# Patient Record
(demographics unavailable — no encounter records)

---
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
## 2024-10-19 NOTE — PROCEDURE
No
[de-identified] : Ultrasound Guided Injection  Indication for ultrasound guidance: Ensure placement within the knee joint  Utlizing the TIMPIKe portable ultrasound machine, the Linear 6cm 13-6 MHz transducer and sterile ultrasound gel, ultrasound guidance with the probe in the short axis, utilizing an in plane approach, was used for the following injection:   Injection: right nee joint. Indication: Osteoarthritis.  A discussion was had with the patient regarding this procedure and all questions were answered. All risks, benefits and alternatives were discussed. These included but were not limited to bleeding, infection, allergic reaction and reaccumulation of fluid. A timeout was done to ensure correct side and pt agreed to the procedure.  Alcohol was used to clean the skin, and betadine was used to sterilize and prep the area in the superolateral joint line aspect of the knee. A 25 -gauge needle was used to inject 3cc of 1% lidocaine without epinephrine.  A 22-gauge needle was used to inject 4cc of 1% lidocaine without epinephrine and 1cc of 40mg/ml methylprednisolone into the knee. A sterile bandage was then applied. The patient tolerated the procedure well.    Injection:left knee joint. Indication: Osteoarthritis.  A discussion was had with the patient regarding this procedure and all questions were answered. All risks, benefits and alternatives were discussed. These included but were not limited to bleeding, infection, allergic reaction and reaccumulation of fluid. A timeout was done to ensure correct side and pt agreed to the procedure.  Alcohol was used to clean the skin, and betadine was used to sterilize and prep the area in the superolateral joint line aspect of the knee. A 25 -gauge needle was used to inject 3cc of 1% lidocaine without epinephrine.  A 22-gauge needle was used to inject 4cc of 1% lidocaine without epinephrine and 1cc of 40mg/ml methylprednisolone into the knee. A sterile bandage was then applied. The patient tolerated the procedure well.  
[de-identified] : Ultrasound Guided Injection  Indication for ultrasound guidance: Ensure placement within the knee joint  Utlizing the [employer] portable ultrasound machine, the Linear 6cm 13-6 MHz transducer and sterile ultrasound gel, ultrasound guidance with the probe in the short axis, utilizing an in plane approach, was used for the following injection:   Injection: right nee joint. Indication: Osteoarthritis.  A discussion was had with the patient regarding this procedure and all questions were answered. All risks, benefits and alternatives were discussed. These included but were not limited to bleeding, infection, allergic reaction and reaccumulation of fluid. A timeout was done to ensure correct side and pt agreed to the procedure.  Alcohol was used to clean the skin, and betadine was used to sterilize and prep the area in the superolateral joint line aspect of the knee. A 25 -gauge needle was used to inject 3cc of 1% lidocaine without epinephrine.  A 22-gauge needle was used to inject 4cc of 1% lidocaine without epinephrine and 1cc of 40mg/ml methylprednisolone into the knee. A sterile bandage was then applied. The patient tolerated the procedure well.    Injection:left knee joint. Indication: Osteoarthritis.  A discussion was had with the patient regarding this procedure and all questions were answered. All risks, benefits and alternatives were discussed. These included but were not limited to bleeding, infection, allergic reaction and reaccumulation of fluid. A timeout was done to ensure correct side and pt agreed to the procedure.  Alcohol was used to clean the skin, and betadine was used to sterilize and prep the area in the superolateral joint line aspect of the knee. A 25 -gauge needle was used to inject 3cc of 1% lidocaine without epinephrine.  A 22-gauge needle was used to inject 4cc of 1% lidocaine without epinephrine and 1cc of 40mg/ml methylprednisolone into the knee. A sterile bandage was then applied. The patient tolerated the procedure well.

## 2024-10-19 NOTE — DISCUSSION/SUMMARY
[de-identified] : I discussed the treatment of degenerative arthritis with the patient at length today. I described the spectrum of treatment from nonoperative modalities to total joint arthroplasty. Noninvasive and nonoperative treatment modalities include weight reduction, activity modification with low impact exercise, PRN use of acetaminophen or anti-inflammatory medication if tolerated, glucosamine/chondroitin supplements, and physical therapy. Further treatments can include corticosteroid injection and the use of hyaluronic acid injections. Definitive treatment can certainly include total joint arthroplasty, but patient would require surgical consultation to discuss that option further. The risks and benefits of each treatment options was discussed and all questions were answered.

## 2024-10-19 NOTE — PHYSICAL EXAM
[Slightly Antalgic] : slightly antalgic [Cane] : ambulates with cane [de-identified] : Constitutional: Well-nourished, well-developed, No acute distress\par  Respiratory:  Good respiratory effort, no SOB\par  Lymphatic: No regional lymphadenopathy, no lymphedema\par  Psychiatric: Pleasant and normal affect, alert and oriented x3\par  Skin: Clean dry and intact B/L UE/LE\par  Musculoskeletal: normal except where as noted in regional exam\par  \par  Left knee:\par  APPEARANCE: no marked deformities, no swelling or malalignment\par  POSITIVE TENDERNESS:  medial joint line. \par  ROM: full & pain with flexion\par  SPECIAL TESTS: stable v/v stress. painless grind. neg Lachman's. neg ant/post drawer. pos Sammy's. \par  NEURO: Normal sensation of LE, DTRs 2+/4 patella and achilles\par  PULSES: 2+ DP/PT pulses\par  B/L Hips: No asymmetry, malalignment, or swelling, Full ROM, 5/5 strength in flexion/ext, IR/ER, Abd/Add, Joints stable\par  B/L Ankles: No asymmetry, malalignment, or swelling, Full ROM, 5/5 strength in DF/PF/Inv/Ev, Joints stable  [de-identified] : Start Imaging: The following radiographs were ordered and read by me during this patient's visit. I reviewed each radiograph in detail with the patient and discussed the findings as highlighted below.   4 Views of the BL knees were obtained today that show no fracture, or dislocation. There is bilateral severe medial and lateral tricompartment degenerative changes, left greater than right.

## 2024-10-19 NOTE — PHYSICAL EXAM
[Slightly Antalgic] : slightly antalgic [Cane] : ambulates with cane [de-identified] : Constitutional: Well-nourished, well-developed, No acute distress\par  Respiratory:  Good respiratory effort, no SOB\par  Lymphatic: No regional lymphadenopathy, no lymphedema\par  Psychiatric: Pleasant and normal affect, alert and oriented x3\par  Skin: Clean dry and intact B/L UE/LE\par  Musculoskeletal: normal except where as noted in regional exam\par  \par  Left knee:\par  APPEARANCE: no marked deformities, no swelling or malalignment\par  POSITIVE TENDERNESS:  medial joint line. \par  ROM: full & pain with flexion\par  SPECIAL TESTS: stable v/v stress. painless grind. neg Lachman's. neg ant/post drawer. pos Sammy's. \par  NEURO: Normal sensation of LE, DTRs 2+/4 patella and achilles\par  PULSES: 2+ DP/PT pulses\par  B/L Hips: No asymmetry, malalignment, or swelling, Full ROM, 5/5 strength in flexion/ext, IR/ER, Abd/Add, Joints stable\par  B/L Ankles: No asymmetry, malalignment, or swelling, Full ROM, 5/5 strength in DF/PF/Inv/Ev, Joints stable  [de-identified] : Start Imaging: The following radiographs were ordered and read by me during this patient's visit. I reviewed each radiograph in detail with the patient and discussed the findings as highlighted below.   4 Views of the BL knees were obtained today that show no fracture, or dislocation. There is bilateral severe medial and lateral tricompartment degenerative changes, left greater than right.
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
## 2024-10-19 NOTE — DISCUSSION/SUMMARY
[de-identified] : I discussed the treatment of degenerative arthritis with the patient at length today. I described the spectrum of treatment from nonoperative modalities to total joint arthroplasty. Noninvasive and nonoperative treatment modalities include weight reduction, activity modification with low impact exercise, PRN use of acetaminophen or anti-inflammatory medication if tolerated, glucosamine/chondroitin supplements, and physical therapy. Further treatments can include corticosteroid injection and the use of hyaluronic acid injections. Definitive treatment can certainly include total joint arthroplasty, but patient would require surgical consultation to discuss that option further. The risks and benefits of each treatment options was discussed and all questions were answered.
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
## 2025-02-04 NOTE — HEALTH RISK ASSESSMENT
[Good] : ~his/her~  mood as  good [No] : In the past 12 months have you used drugs other than those required for medical reasons? No [No falls in past year] : Patient reported no falls in the past year [0] : 2) Feeling down, depressed, or hopeless: Not at all (0) [PHQ-2 Negative - No further assessment needed] : PHQ-2 Negative - No further assessment needed [Never] : Never [Alone] : lives alone [Retired] : retired [Single] : single [Fully functional (bathing, dressing, toileting, transferring, walking, feeding)] : Fully functional (bathing, dressing, toileting, transferring, walking, feeding) [Fully functional (using the telephone, shopping, preparing meals, housekeeping, doing laundry, using] : Fully functional and needs no help or supervision to perform IADLs (using the telephone, shopping, preparing meals, housekeeping, doing laundry, using transportation, managing medications and managing finances)

## 2025-04-17 NOTE — PHYSICAL EXAM
[No Spinal Tenderness] : no spinal tenderness [Grossly Normal Strength/Tone] : grossly normal strength/tone [Normal] : affect was normal and insight and judgment were intact [de-identified] : mild TTP R lower back; straight leg raise neg bilaterally [de-identified] : pain w internal and external rotation of R hip; L hip normal ROM

## 2025-04-17 NOTE — HISTORY OF PRESENT ILLNESS
[FreeTextEntry8] : Acute visit  Has R mid back pain  Started a few months ago Each episodes lasts for around an hour; no pain this visit Described as throbbing pain Used warm compress and massage which helps Movement makes it worse Also w R groin pain intermittently Needs knee replacement so unsteady on feet and using cane to walk Denies weakness, numbness, urinary/bowel incontinence Denies recent falls, or heavy lifting

## 2025-04-17 NOTE — ASSESSMENT
[FreeTextEntry1] : Acute visit for back and groin pain. Has R mid back pain without alarm features likely MSK strain. Advised Tylenol 500mg q6hrs PRN and lidocaine patch PRN. Discussed using warm compress and stretching exercises. Pt declines trial of PT at this time. Avoiding muscle relaxant due to age and unsteadiness on feet. Also w R groin pain w limited R hip external and internal rotation due to pain. Had MRI R hip in 2021 which showed mod to severe hip arthrosis. Advised to f/u w orthopedics and discuss at her appt next week as they may consider repeat imaging to assess extent of OA to determine best course of treatment. Also plans on getting knee replacement surgery soon as she has to ambulate w cane due to pain.  Total time spent caring for this patient today was 35 minutes. This includes time spent before the visit reviewing the chart, time spent during visit, and time spent after the visit on documentation.

## 2025-04-22 NOTE — HISTORY OF PRESENT ILLNESS
[de-identified] : Jo Woodard is a 71-year-old female presents to the office for evaluation of her bilateral knee pain.  Patient has been experiencing bilateral (left greater than right) knee pain for years.  She has tried multiple injections (viscosupplementation and corticosteroid), last in October 2024.  She does home exercise.  She can have crepitus.  Patient has tried Tylenol.  She has tried physical therapy.  Patient walks with a cane.  History: HTN, HLD

## 2025-04-22 NOTE — DISCUSSION/SUMMARY
[de-identified] : Jo Woodard is a 71-year-old female presents to the office for evaluation of her bilateral knee pain.  X-rays showed bilateral knee osteoarthritis.  Examination showed good bilateral knee range of motion. Discussed with the patient the examination and imaging findings.  Discussed with the patient the management of patient's knee osteoarthritis at this time, including physical therapy, anti-inflammatories, and injections.  Patient was given referral for physical therapy.  Patient will take over-the-counter anti-inflammatories as needed for pain control.  Patient will follow-up in 3 months for reevaluation and management.  Patient understanding and in agreement the plan.  All questions answered   Plan: -Physical Therapy -Over-the-counter anti-inflammatories as needed for pain control -Follow up in 3 months for reevaluation and management

## 2025-04-22 NOTE — PHYSICAL EXAM
[de-identified] : Constitutional:  71 year old female, alert and oriented, cooperative, in no acute distress.  HEENT  NC/AT.  Appearance: symmetric  Chest/Respiratory  Respiratory effort: no intercostal retractions or use of accessory muscles. Nonlabored Breathing  Mental Status:  Judgment, insight: intact Orientation: oriented to time, place, and person  Left Knee  Inspection:     Skin intact, no rashes or lesions     No Effusion     Non-tender to palpation over tibial tubercle, patella, medial and lateral joint line, and pes insertion.  Range of Motion: 	Extension - 0 degrees 	Flexion - 120 degrees 	Alignment - Varus 5 degrees 	Extensor lag: None  Stability:      Demonstrates no Varus or Valgus instability      Negative Anterior or Posterior drawer.      Negative Lachman's  Patella: stable, tracks well.   Right Knee  Inspection:     Skin intact, no rashes or lesions     No Effusion     Non-tender to palpation over tibial tubercle, patella, medial and lateral joint line, and pes insertion.  Range of Motion: 	Extension - 0 degrees 	Flexion - 120 degrees 	Alignment - Varus 5 degrees 	Extensor lag: None  Stability:      Demonstrates no Varus or Valgus instability      Negative Anterior or Posterior drawer.      Negative Lachman's  Patella: stable, tracks well.   Neurologic Exam     Motor intact including 5/5 Extensor Hallucis Longus, 5/5 Flexor Hallucis Longus, 5/5 Tibialis Anterior and 5/5 Gastrocnemius     Sensation Intact to Light Touch including Saphenous, Sural, Superficial Peroneal, Deep Peroneal, Tibial nerve distributions  Vascular Exam     Foot is warm and well perfused with 2+ Dorsalis Pedis Pulse   No pain with range of motion of the bilateral hips. No lumbar paraspinal muscle tenderness. [de-identified] : XRay: XRays of the Right Knee (4 Views) taken in the office today and reviewed with the patient. XRays demonstrate tricompartmental joint space narrowing, with bone on bone articulations in the medial compartment, with subchondral sclerosis, overlying osteophytes, all consistent with severe osteoarthritis, KL thGthrthathdtheth:th th5th. There is varus alignment. (my personal interpretation)  XRay: XRays of the Left Knee (4 Views) taken in the office today and reviewed with the patient. XRays demonstrate tricompartmental joint space narrowing, with bone on bone articulations in the medial compartment, with subchondral sclerosis, overlying osteophytes, all consistent with severe osteoarthritis, KL thGthrthathdtheth:th th5th. There is varus alignment. (my personal interpretation)

## 2025-04-22 NOTE — PHYSICAL EXAM
[de-identified] : Constitutional:  71 year old female, alert and oriented, cooperative, in no acute distress.  HEENT  NC/AT.  Appearance: symmetric  Chest/Respiratory  Respiratory effort: no intercostal retractions or use of accessory muscles. Nonlabored Breathing  Mental Status:  Judgment, insight: intact Orientation: oriented to time, place, and person  Left Knee  Inspection:     Skin intact, no rashes or lesions     No Effusion     Non-tender to palpation over tibial tubercle, patella, medial and lateral joint line, and pes insertion.  Range of Motion: 	Extension - 0 degrees 	Flexion - 120 degrees 	Alignment - Varus 5 degrees 	Extensor lag: None  Stability:      Demonstrates no Varus or Valgus instability      Negative Anterior or Posterior drawer.      Negative Lachman's  Patella: stable, tracks well.   Right Knee  Inspection:     Skin intact, no rashes or lesions     No Effusion     Non-tender to palpation over tibial tubercle, patella, medial and lateral joint line, and pes insertion.  Range of Motion: 	Extension - 0 degrees 	Flexion - 120 degrees 	Alignment - Varus 5 degrees 	Extensor lag: None  Stability:      Demonstrates no Varus or Valgus instability      Negative Anterior or Posterior drawer.      Negative Lachman's  Patella: stable, tracks well.   Neurologic Exam     Motor intact including 5/5 Extensor Hallucis Longus, 5/5 Flexor Hallucis Longus, 5/5 Tibialis Anterior and 5/5 Gastrocnemius     Sensation Intact to Light Touch including Saphenous, Sural, Superficial Peroneal, Deep Peroneal, Tibial nerve distributions  Vascular Exam     Foot is warm and well perfused with 2+ Dorsalis Pedis Pulse   No pain with range of motion of the bilateral hips. No lumbar paraspinal muscle tenderness. [de-identified] : XRay: XRays of the Right Knee (4 Views) taken in the office today and reviewed with the patient. XRays demonstrate tricompartmental joint space narrowing, with bone on bone articulations in the medial compartment, with subchondral sclerosis, overlying osteophytes, all consistent with severe osteoarthritis, KL rdGrdrrdarddrderd:rd rd3rd. There is varus alignment. (my personal interpretation)  XRay: XRays of the Left Knee (4 Views) taken in the office today and reviewed with the patient. XRays demonstrate tricompartmental joint space narrowing, with bone on bone articulations in the medial compartment, with subchondral sclerosis, overlying osteophytes, all consistent with severe osteoarthritis, KL rdGrdrrdarddrderd:rd rd3rd. There is varus alignment. (my personal interpretation)

## 2025-04-22 NOTE — HISTORY OF PRESENT ILLNESS
[de-identified] : Jo Woodard is a 71-year-old female presents to the office for evaluation of her bilateral knee pain.  Patient has been experiencing bilateral (left greater than right) knee pain for years.  She has tried multiple injections (viscosupplementation and corticosteroid), last in October 2024.  She does home exercise.  She can have crepitus.  Patient has tried Tylenol.  She has tried physical therapy.  Patient walks with a cane.  History: HTN, HLD

## 2025-04-22 NOTE — DISCUSSION/SUMMARY
[de-identified] : Jo Woodard is a 71-year-old female presents to the office for evaluation of her bilateral knee pain.  X-rays showed bilateral knee osteoarthritis.  Examination showed good bilateral knee range of motion. Discussed with the patient the examination and imaging findings.  Discussed with the patient the management of patient's knee osteoarthritis at this time, including physical therapy, anti-inflammatories, and injections.  Patient was given referral for physical therapy.  Patient will take over-the-counter anti-inflammatories as needed for pain control.  Patient will follow-up in 3 months for reevaluation and management.  Patient understanding and in agreement the plan.  All questions answered   Plan: -Physical Therapy -Over-the-counter anti-inflammatories as needed for pain control -Follow up in 3 months for reevaluation and management

## 2025-04-23 NOTE — REVIEW OF SYSTEMS
[Dyspnea on exertion] : not dyspnea during exertion [Chest Discomfort] : chest discomfort [Lower Ext Edema] : no extremity edema [Leg Claudication] : no intermittent leg claudication [Palpitations] : no palpitations [Orthopnea] : no orthopnea [PND] : no PND [Syncope] : no syncope [Negative] : Musculoskeletal

## 2025-04-23 NOTE — HISTORY OF PRESENT ILLNESS
[FreeTextEntry1] : Occasional chest tightness unrelated to exertion. Denies shortness of breath or palpitations.

## 2025-04-23 NOTE — DISCUSSION/SUMMARY
[Unlikely Cardiac Ischemia (Low Prob.)] : chest pain unlikely to represent cardiac ischemia (low probability) [Stable] : stable [FreeTextEntry1] : Currently stable from a cardiovascular standpoint. Normotensive. Euvolemic. Atypical chest pains. Consider musculoskeletal or GI etiology. Continue current medications. ECG completed today and reviewed (findings as noted above). Will obtain an echocardiogram to assess her cardiac structures and function. In addition, will try to obtain record of cardiac cath that she may have had a few years back at [facility]. [EKG obtained to assist in diagnosis and management of assessed problem(s)] : EKG obtained to assist in diagnosis and management of assessed problem(s)
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
## 2025-07-24 NOTE — HISTORY OF PRESENT ILLNESS
[de-identified] : 7/24/2025  Jo Woodard is a 71-year-old female who presents to the office forFollow-up of her bilateral knee pain.  Patient continues to have knee pain.  She has tried Tylenol.  She has not tried physical therapy.  4/22/2025 Jo Woodard is a 71-year-old female presents to the office for evaluation of her bilateral knee pain.  Patient has been experiencing bilateral (left greater than right) knee pain for years.  She has tried multiple injections (viscosupplementation and corticosteroid), last in October 2024.  She does home exercise.  She can have crepitus.  Patient has tried Tylenol.  She has tried physical therapy.  Patient walks with a cane.  History: HTN, HLD

## 2025-07-24 NOTE — PHYSICAL EXAM
[de-identified] : Constitutional:  71 year old female, alert and oriented, cooperative, in no acute distress.  HEENT  NC/AT.  Appearance: symmetric  Chest/Respiratory  Respiratory effort: no intercostal retractions or use of accessory muscles. Nonlabored Breathing  Mental Status:  Judgment, insight: intact Orientation: oriented to time, place, and person  Left Knee  Inspection:     Skin intact, no rashes or lesions     No Effusion     Non-tender to palpation over tibial tubercle, patella, medial and lateral joint line, and pes insertion.  Range of Motion: 	Extension - 0 degrees 	Flexion - 120 degrees 	Alignment - Varus 5 degrees 	Extensor lag: None  Stability:      Demonstrates no Varus or Valgus instability      Negative Anterior or Posterior drawer.      Negative Lachman's  Patella: stable, tracks well.   Right Knee  Inspection:     Skin intact, no rashes or lesions     No Effusion     Non-tender to palpation over tibial tubercle, patella, medial and lateral joint line, and pes insertion.  Range of Motion: 	Extension - 0 degrees 	Flexion - 120 degrees 	Alignment - Varus 5 degrees 	Extensor lag: None  Stability:      Demonstrates no Varus or Valgus instability      Negative Anterior or Posterior drawer.      Negative Lachman's  Patella: stable, tracks well.   Neurologic Exam     Motor intact including 5/5 Extensor Hallucis Longus, 5/5 Flexor Hallucis Longus, 5/5 Tibialis Anterior and 5/5 Gastrocnemius     Sensation Intact to Light Touch including Saphenous, Sural, Superficial Peroneal, Deep Peroneal, Tibial nerve distributions  Vascular Exam     Foot is warm and well perfused with 2+ Dorsalis Pedis Pulse   No pain with range of motion of the bilateral hips. No lumbar paraspinal muscle tenderness. [de-identified] : XRay: XRays of the Right Knee (4 Views) taken on 4/22/2025. XRays demonstrate tricompartmental joint space narrowing, with bone on bone articulations in the medial compartment, with subchondral sclerosis, overlying osteophytes, all consistent with severe osteoarthritis, KL rdGrdrrdarddrderd:rd rd3rd. There is varus alignment. (my personal interpretation)  XRay: XRays of the Left Knee (4 Views) taken on 4/22/2025. XRays demonstrate tricompartmental joint space narrowing, with bone on bone articulations in the medial compartment, with subchondral sclerosis, overlying osteophytes, all consistent with severe osteoarthritis, KL rdGrdrrdarddrderd:rd rd3rd. There is varus alignment. (my personal interpretation)

## 2025-07-24 NOTE — DISCUSSION/SUMMARY
[de-identified] : Jo Woodard is a 71-year-old female presents to the office for evaluation of her bilateral knee pain.  X-rays showed bilateral knee osteoarthritis.  Examination showed good bilateral knee range of motion. Discussed with the patient the examination and imaging findings.  Discussed with the patient the management of patient's knee osteoarthritis at this time, including physical therapy, anti-inflammatories, and injections.  Patient was given referral for physical therapy.  Patient will take over-the-counter anti-inflammatories as needed for pain control.  Patient will follow-up in 2 months for reevaluation and management.  Patient understanding and in agreement the plan.  All questions answered   Plan: -Physical Therapy -Over-the-counter anti-inflammatories as needed for pain control -Follow up in 2 months for reevaluation and management

## 2025-07-24 NOTE — PHYSICAL EXAM
[de-identified] : Constitutional:  71 year old female, alert and oriented, cooperative, in no acute distress.  HEENT  NC/AT.  Appearance: symmetric  Chest/Respiratory  Respiratory effort: no intercostal retractions or use of accessory muscles. Nonlabored Breathing  Mental Status:  Judgment, insight: intact Orientation: oriented to time, place, and person  Left Knee  Inspection:     Skin intact, no rashes or lesions     No Effusion     Non-tender to palpation over tibial tubercle, patella, medial and lateral joint line, and pes insertion.  Range of Motion: 	Extension - 0 degrees 	Flexion - 120 degrees 	Alignment - Varus 5 degrees 	Extensor lag: None  Stability:      Demonstrates no Varus or Valgus instability      Negative Anterior or Posterior drawer.      Negative Lachman's  Patella: stable, tracks well.   Right Knee  Inspection:     Skin intact, no rashes or lesions     No Effusion     Non-tender to palpation over tibial tubercle, patella, medial and lateral joint line, and pes insertion.  Range of Motion: 	Extension - 0 degrees 	Flexion - 120 degrees 	Alignment - Varus 5 degrees 	Extensor lag: None  Stability:      Demonstrates no Varus or Valgus instability      Negative Anterior or Posterior drawer.      Negative Lachman's  Patella: stable, tracks well.   Neurologic Exam     Motor intact including 5/5 Extensor Hallucis Longus, 5/5 Flexor Hallucis Longus, 5/5 Tibialis Anterior and 5/5 Gastrocnemius     Sensation Intact to Light Touch including Saphenous, Sural, Superficial Peroneal, Deep Peroneal, Tibial nerve distributions  Vascular Exam     Foot is warm and well perfused with 2+ Dorsalis Pedis Pulse   No pain with range of motion of the bilateral hips. No lumbar paraspinal muscle tenderness. [de-identified] : XRay: XRays of the Right Knee (4 Views) taken on 4/22/2025. XRays demonstrate tricompartmental joint space narrowing, with bone on bone articulations in the medial compartment, with subchondral sclerosis, overlying osteophytes, all consistent with severe osteoarthritis, KL thGthrthathdtheth:th th5th. There is varus alignment. (my personal interpretation)  XRay: XRays of the Left Knee (4 Views) taken on 4/22/2025. XRays demonstrate tricompartmental joint space narrowing, with bone on bone articulations in the medial compartment, with subchondral sclerosis, overlying osteophytes, all consistent with severe osteoarthritis, KL thGthrthathdtheth:th th5th. There is varus alignment. (my personal interpretation)

## 2025-07-24 NOTE — HISTORY OF PRESENT ILLNESS
[de-identified] : 7/24/2025  Jo Woodard is a 71-year-old female who presents to the office forFollow-up of her bilateral knee pain.  Patient continues to have knee pain.  She has tried Tylenol.  She has not tried physical therapy.  4/22/2025 Jo Woodard is a 71-year-old female presents to the office for evaluation of her bilateral knee pain.  Patient has been experiencing bilateral (left greater than right) knee pain for years.  She has tried multiple injections (viscosupplementation and corticosteroid), last in October 2024.  She does home exercise.  She can have crepitus.  Patient has tried Tylenol.  She has tried physical therapy.  Patient walks with a cane.  History: HTN, HLD

## 2025-07-24 NOTE — DISCUSSION/SUMMARY
[de-identified] : Jo Woodard is a 71-year-old female presents to the office for evaluation of her bilateral knee pain.  X-rays showed bilateral knee osteoarthritis.  Examination showed good bilateral knee range of motion. Discussed with the patient the examination and imaging findings.  Discussed with the patient the management of patient's knee osteoarthritis at this time, including physical therapy, anti-inflammatories, and injections.  Patient was given referral for physical therapy.  Patient will take over-the-counter anti-inflammatories as needed for pain control.  Patient will follow-up in 2 months for reevaluation and management.  Patient understanding and in agreement the plan.  All questions answered   Plan: -Physical Therapy -Over-the-counter anti-inflammatories as needed for pain control -Follow up in 2 months for reevaluation and management